# Patient Record
Sex: MALE | Race: WHITE
[De-identification: names, ages, dates, MRNs, and addresses within clinical notes are randomized per-mention and may not be internally consistent; named-entity substitution may affect disease eponyms.]

---

## 2022-07-09 ENCOUNTER — HOSPITAL ENCOUNTER (EMERGENCY)
Dept: HOSPITAL 47 - EC | Age: 27
LOS: 1 days | Discharge: HOME | End: 2022-07-10
Payer: COMMERCIAL

## 2022-07-09 DIAGNOSIS — R25.9: Primary | ICD-10-CM

## 2022-07-09 DIAGNOSIS — F17.200: ICD-10-CM

## 2022-07-09 LAB
ALBUMIN SERPL-MCNC: 4.1 G/DL (ref 3.5–5)
ALP SERPL-CCNC: 68 U/L (ref 38–126)
ALT SERPL-CCNC: 15 U/L (ref 4–49)
ANION GAP SERPL CALC-SCNC: 11 MMOL/L
APAP SPEC-MCNC: <10 UG/ML
AST SERPL-CCNC: 20 U/L (ref 17–59)
BASOPHILS # BLD AUTO: 0.2 K/UL (ref 0–0.2)
BASOPHILS NFR BLD AUTO: 2 %
BUN SERPL-SCNC: 15 MG/DL (ref 9–20)
CALCIUM SPEC-MCNC: 8.7 MG/DL (ref 8.4–10.2)
CHLORIDE SERPL-SCNC: 108 MMOL/L (ref 98–107)
CO2 SERPL-SCNC: 21 MMOL/L (ref 22–30)
EOSINOPHIL # BLD AUTO: 0.1 K/UL (ref 0–0.7)
EOSINOPHIL NFR BLD AUTO: 1 %
ERYTHROCYTE [DISTWIDTH] IN BLOOD BY AUTOMATED COUNT: 4.41 M/UL (ref 4.3–5.9)
ERYTHROCYTE [DISTWIDTH] IN BLOOD: 12.6 % (ref 11.5–15.5)
GLUCOSE SERPL-MCNC: 117 MG/DL (ref 74–99)
HCT VFR BLD AUTO: 39.8 % (ref 39–53)
HGB BLD-MCNC: 13.8 GM/DL (ref 13–17.5)
LIPASE SERPL-CCNC: 148 U/L (ref 23–300)
LYMPHOCYTES # SPEC AUTO: 4.3 K/UL (ref 1–4.8)
LYMPHOCYTES NFR SPEC AUTO: 44 %
MCH RBC QN AUTO: 31.4 PG (ref 25–35)
MCHC RBC AUTO-ENTMCNC: 34.8 G/DL (ref 31–37)
MCV RBC AUTO: 90.3 FL (ref 80–100)
MONOCYTES # BLD AUTO: 0.5 K/UL (ref 0–1)
MONOCYTES NFR BLD AUTO: 5 %
NEUTROPHILS # BLD AUTO: 4.4 K/UL (ref 1.3–7.7)
NEUTROPHILS NFR BLD AUTO: 45 %
PLATELET # BLD AUTO: 269 K/UL (ref 150–450)
POTASSIUM SERPL-SCNC: 3.7 MMOL/L (ref 3.5–5.1)
PROT SERPL-MCNC: 6.5 G/DL (ref 6.3–8.2)
SALICYLATES SERPL-MCNC: <1 MG/DL
SODIUM SERPL-SCNC: 140 MMOL/L (ref 137–145)
WBC # BLD AUTO: 9.7 K/UL (ref 3.8–10.6)

## 2022-07-09 PROCEDURE — 96361 HYDRATE IV INFUSION ADD-ON: CPT

## 2022-07-09 PROCEDURE — 83690 ASSAY OF LIPASE: CPT

## 2022-07-09 PROCEDURE — 36415 COLL VENOUS BLD VENIPUNCTURE: CPT

## 2022-07-09 PROCEDURE — 80320 DRUG SCREEN QUANTALCOHOLS: CPT

## 2022-07-09 PROCEDURE — 96375 TX/PRO/DX INJ NEW DRUG ADDON: CPT

## 2022-07-09 PROCEDURE — 85025 COMPLETE CBC W/AUTO DIFF WBC: CPT

## 2022-07-09 PROCEDURE — 80179 DRUG ASSAY SALICYLATE: CPT

## 2022-07-09 PROCEDURE — 96374 THER/PROPH/DIAG INJ IV PUSH: CPT

## 2022-07-09 PROCEDURE — 99284 EMERGENCY DEPT VISIT MOD MDM: CPT

## 2022-07-09 PROCEDURE — 80053 COMPREHEN METABOLIC PANEL: CPT

## 2022-07-09 PROCEDURE — 80143 DRUG ASSAY ACETAMINOPHEN: CPT

## 2022-07-09 PROCEDURE — 93005 ELECTROCARDIOGRAM TRACING: CPT

## 2022-07-09 PROCEDURE — 80306 DRUG TEST PRSMV INSTRMNT: CPT

## 2022-07-09 NOTE — ED
Overdose HPI





- General


Chief Complaint: Overdose


Stated Complaint: Adverse Reaction


Time Seen by Provider: 07/09/22 21:41


Source: patient, EMS, RN notes reviewed, old records reviewed


Mode of arrival: EMS


Limitations: no limitations





- History of Present Illness


Initial Comments: 





This is a 26-year-old male the emergency department for evaluation.  Patient 

presents today for evaluation regards to overdose.  Patient has admission of 

using marijuana today.  States he got some marijuana from a new place and is 

unsure of how it is affecting him.  Patient is no medical history takes no 

medications


MD Complaint: intentional overdose, accidental overdose


-: minutes(s)


Intent: unwilling to say


How Overdose Was Discovered: called family/friend


Context: Accidental Overdose: wanted to get high


Associated Symptoms: paranoia, hallucinations


Treatments Prior to Arrival: none





- Related Data


                                    Allergies











Allergy/AdvReac Type Severity Reaction Status Date / Time


 


No Known Allergies Allergy   Verified 07/09/22 21:47














Review of Systems


ROS Statement: 


Those systems with pertinent positive or pertinent negative responses have been 

documented in the HPI.





ROS Other: All systems not noted in ROS Statement are negative.





Past Medical History


Past Medical History: No Reported History


History of Any Multi-Drug Resistant Organisms: None Reported


Past Surgical History: No Surgical Hx Reported


Smoking Status: Current every day smoker


Past Alcohol Use History: None Reported


Past Drug Use History: Marijuana





General Exam


Limitations: no limitations


General appearance: alert, in no apparent distress


Head exam: Present: atraumatic, normocephalic, normal inspection


Eye exam: Present: normal appearance, PERRL, EOMI.  Absent: scleral icterus, 

conjunctival injection, periorbital swelling


ENT exam: Present: normal exam, mucous membranes moist


Neck exam: Present: normal inspection.  Absent: tenderness, meningismus, 

lymphadenopathy


Respiratory exam: Present: normal lung sounds bilaterally.  Absent: respiratory 

distress, wheezes, rales, rhonchi, stridor


Cardiovascular Exam: Present: regular rate, normal rhythm, normal heart sounds. 

Absent: systolic murmur, diastolic murmur, rubs, gallop, clicks


GI/Abdominal exam: Present: soft, normal bowel sounds.  Absent: distended, t

enderness, guarding, rebound, rigid


Extremities exam: Present: normal inspection, full ROM, normal capillary refill.

 Absent: tenderness, pedal edema, joint swelling, calf tenderness


Back exam: Present: normal inspection


Neurological exam: Present: alert, oriented X3, CN II-XII intact


Psychiatric exam: Present: normal affect, normal mood


Skin exam: Present: warm, dry, intact, normal color.  Absent: rash





Course


                                   Vital Signs











  07/09/22





  21:28


 


Pulse Rate 142 H


 


Respiratory 20





Rate 


 


Blood Pressure 138/94


 


O2 Sat by Pulse 98





Oximetry 














- Reevaluation(s)


Reevaluation #1: 





07/09/22 22:06


Medical record is reviewed


Reevaluation #2: 





07/10/22 01:43


Symptoms have resolved here in the ER





Medical Decision Making





- Medical Decision Making





26 male to the emergency department for evaluation of altered mental status and 

inability control body movements.  Patient's did smoke marijuana prior to 

arrival, movements stop spontaneously here in the emergency department.  Drug 

screen is negative.  For any new drugs.  Patient can be discharged home





- Lab Data


Result diagrams: 


                                 07/09/22 21:52





                                 07/09/22 21:52


                                   Lab Results











  07/09/22 07/09/22 07/10/22 Range/Units





  21:52 21:52 00:42 


 


WBC  9.7    (3.8-10.6)  k/uL


 


RBC  4.41    (4.30-5.90)  m/uL


 


Hgb  13.8    (13.0-17.5)  gm/dL


 


Hct  39.8    (39.0-53.0)  %


 


MCV  90.3    (80.0-100.0)  fL


 


MCH  31.4    (25.0-35.0)  pg


 


MCHC  34.8    (31.0-37.0)  g/dL


 


RDW  12.6    (11.5-15.5)  %


 


Plt Count  269    (150-450)  k/uL


 


MPV  7.3    


 


Neutrophils %  45    %


 


Lymphocytes %  44    %


 


Monocytes %  5    %


 


Eosinophils %  1    %


 


Basophils %  2    %


 


Neutrophils #  4.4    (1.3-7.7)  k/uL


 


Lymphocytes #  4.3    (1.0-4.8)  k/uL


 


Monocytes #  0.5    (0-1.0)  k/uL


 


Eosinophils #  0.1    (0-0.7)  k/uL


 


Basophils #  0.2    (0-0.2)  k/uL


 


Sodium   140   (137-145)  mmol/L


 


Potassium   3.7   (3.5-5.1)  mmol/L


 


Chloride   108 H   ()  mmol/L


 


Carbon Dioxide   21 L   (22-30)  mmol/L


 


Anion Gap   11   mmol/L


 


BUN   15   (9-20)  mg/dL


 


Creatinine   1.12   (0.66-1.25)  mg/dL


 


Est GFR (CKD-EPI)AfAm   >90   (>60 ml/min/1.73 sqM)  


 


Est GFR (CKD-EPI)NonAf   >90   (>60 ml/min/1.73 sqM)  


 


Glucose   117 H   (74-99)  mg/dL


 


Calcium   8.7   (8.4-10.2)  mg/dL


 


Total Bilirubin   1.2   (0.2-1.3)  mg/dL


 


AST   20   (17-59)  U/L


 


ALT   15   (4-49)  U/L


 


Alkaline Phosphatase   68   ()  U/L


 


Total Protein   6.5   (6.3-8.2)  g/dL


 


Albumin   4.1   (3.5-5.0)  g/dL


 


Lipase   148   ()  U/L


 


Salicylates   <1.0   mg/dL


 


Urine Opiates Screen    Not Detected  (NotDetected)  


 


Ur Oxycodone Screen    Not Detected  (NotDetected)  


 


Urine Methadone Screen    Not Detected  (NotDetected)  


 


Ur Propoxyphene Screen    Not Detected  (NotDetected)  


 


Acetaminophen   <10.0   ug/mL


 


Ur Barbiturates Screen    Not Detected  (NotDetected)  


 


U Tricyclic Antidepress    Not Detected  (NotDetected)  


 


Ur Phencyclidine Scrn    Not Detected  (NotDetected)  


 


Ur Amphetamines Screen    Not Detected  (NotDetected)  


 


U Methamphetamines Scrn    Not Detected  (NotDetected)  


 


U Benzodiazepines Scrn    Detected H  (NotDetected)  


 


Urine Cocaine Screen    Not Detected  (NotDetected)  


 


U Marijuana (THC) Screen    Detected H  (NotDetected)  


 


Serum Alcohol   <10   mg/dL














- EKG Data


-: EKG Interpreted by Me (EKG sinus tachycardia 123  QRS 87 QTc 408)





Disposition


Clinical Impression: 


 Involuntary movements





Disposition: HOME SELF-CARE


Condition: Good


Instructions (If sedation given, give patient instructions):  Tremors (ED)


Is patient prescribed a controlled substance at d/c from ED?: No


Referrals: 


None,Stated [Primary Care Provider] - 1-2 days

## 2022-07-10 VITALS — HEART RATE: 80 BPM | RESPIRATION RATE: 16 BRPM | SYSTOLIC BLOOD PRESSURE: 124 MMHG | DIASTOLIC BLOOD PRESSURE: 78 MMHG
